# Patient Record
Sex: FEMALE | Race: WHITE | NOT HISPANIC OR LATINO
[De-identification: names, ages, dates, MRNs, and addresses within clinical notes are randomized per-mention and may not be internally consistent; named-entity substitution may affect disease eponyms.]

---

## 2017-04-10 PROBLEM — Z00.00 ENCOUNTER FOR PREVENTIVE HEALTH EXAMINATION: Status: ACTIVE | Noted: 2017-04-10

## 2017-05-10 ENCOUNTER — APPOINTMENT (OUTPATIENT)
Dept: OPHTHALMOLOGY | Facility: CLINIC | Age: 62
End: 2017-05-10

## 2019-07-17 ENCOUNTER — APPOINTMENT (OUTPATIENT)
Dept: OTOLARYNGOLOGY | Facility: CLINIC | Age: 64
End: 2019-07-17
Payer: MEDICARE

## 2019-07-17 ENCOUNTER — APPOINTMENT (OUTPATIENT)
Dept: OTOLARYNGOLOGY | Facility: CLINIC | Age: 64
End: 2019-07-17
Payer: COMMERCIAL

## 2019-07-17 VITALS
HEIGHT: 65 IN | DIASTOLIC BLOOD PRESSURE: 73 MMHG | HEART RATE: 76 BPM | WEIGHT: 125 LBS | BODY MASS INDEX: 20.83 KG/M2 | SYSTOLIC BLOOD PRESSURE: 117 MMHG

## 2019-07-17 DIAGNOSIS — H91.13 PRESBYCUSIS, BILATERAL: ICD-10-CM

## 2019-07-17 DIAGNOSIS — H61.23 IMPACTED CERUMEN, BILATERAL: ICD-10-CM

## 2019-07-17 DIAGNOSIS — H90.3 SENSORINEURAL HEARING LOSS, BILATERAL: ICD-10-CM

## 2019-07-17 PROCEDURE — 99213 OFFICE O/P EST LOW 20 MIN: CPT | Mod: 25

## 2019-07-17 PROCEDURE — 92567 TYMPANOMETRY: CPT

## 2019-07-17 PROCEDURE — 92557 COMPREHENSIVE HEARING TEST: CPT

## 2019-07-17 PROCEDURE — 69210 REMOVE IMPACTED EAR WAX UNI: CPT

## 2019-07-17 RX ORDER — AMITRIPTYLINE HYDROCHLORIDE 75 MG/1
TABLET, FILM COATED ORAL
Refills: 0 | Status: ACTIVE | COMMUNITY

## 2019-07-17 NOTE — HISTORY OF PRESENT ILLNESS
[de-identified] : SUDHAKAR URBANO is a 64 year woman with a history of neurogenic cough doing well with Amitriptyline hs. She complains of processive hearing loss. She has bilateral tinnitus.

## 2019-07-17 NOTE — REASON FOR VISIT
[Hearing Loss] : hearing loss [Subsequent Evaluation] : a subsequent evaluation for [FreeTextEntry2] : cough

## 2019-07-17 NOTE — REVIEW OF SYSTEMS
[Hearing Loss] : hearing loss [Cough] : cough [Patient Intake Form Reviewed] : Patient intake form was reviewed

## 2019-08-07 ENCOUNTER — APPOINTMENT (OUTPATIENT)
Dept: OTOLARYNGOLOGY | Facility: CLINIC | Age: 64
End: 2019-08-07
Payer: COMMERCIAL

## 2019-08-07 PROCEDURE — 92591 HEARING AID EXAMINATION & SELECTION BINAURAL: CPT | Mod: NC

## 2019-08-23 PROBLEM — H90.3 SENSORINEURAL HEARING LOSS (SNHL) OF BOTH EARS: Status: ACTIVE | Noted: 2019-08-23

## 2019-09-05 ENCOUNTER — APPOINTMENT (OUTPATIENT)
Dept: OTOLARYNGOLOGY | Facility: CLINIC | Age: 64
End: 2019-09-05
Payer: SELF-PAY

## 2019-09-05 PROCEDURE — V5261I: CUSTOM

## 2019-09-12 ENCOUNTER — APPOINTMENT (OUTPATIENT)
Dept: OTOLARYNGOLOGY | Facility: CLINIC | Age: 64
End: 2019-09-12
Payer: SELF-PAY

## 2019-09-12 PROCEDURE — 92593: CPT | Mod: NC

## 2020-02-11 ENCOUNTER — RX RENEWAL (OUTPATIENT)
Age: 65
End: 2020-02-11

## 2020-02-11 DIAGNOSIS — R05 COUGH: ICD-10-CM

## 2021-03-09 ENCOUNTER — RX RENEWAL (OUTPATIENT)
Age: 66
End: 2021-03-09

## 2021-07-30 ENCOUNTER — RX RENEWAL (OUTPATIENT)
Age: 66
End: 2021-07-30

## 2021-11-12 ENCOUNTER — APPOINTMENT (OUTPATIENT)
Dept: OTOLARYNGOLOGY | Facility: CLINIC | Age: 66
End: 2021-11-12
Payer: SELF-PAY

## 2021-11-12 PROCEDURE — 92593: CPT | Mod: NC

## 2021-11-24 ENCOUNTER — NON-APPOINTMENT (OUTPATIENT)
Age: 66
End: 2021-11-24

## 2022-03-23 ENCOUNTER — RX RENEWAL (OUTPATIENT)
Age: 67
End: 2022-03-23

## 2023-02-19 ENCOUNTER — NON-APPOINTMENT (OUTPATIENT)
Age: 68
End: 2023-02-19

## 2023-02-21 ENCOUNTER — RX RENEWAL (OUTPATIENT)
Age: 68
End: 2023-02-21

## 2023-10-25 ENCOUNTER — RX RENEWAL (OUTPATIENT)
Age: 68
End: 2023-10-25

## 2023-10-25 RX ORDER — AMITRIPTYLINE HYDROCHLORIDE 25 MG/1
25 TABLET, FILM COATED ORAL
Qty: 90 | Refills: 1 | Status: ACTIVE | COMMUNITY
Start: 2020-02-11 | End: 1900-01-01

## 2024-04-24 ENCOUNTER — NON-APPOINTMENT (OUTPATIENT)
Age: 69
End: 2024-04-24

## 2024-09-16 ENCOUNTER — APPOINTMENT (OUTPATIENT)
Dept: OTOLARYNGOLOGY | Facility: CLINIC | Age: 69
End: 2024-09-16

## 2024-10-25 ENCOUNTER — RX RENEWAL (OUTPATIENT)
Age: 69
End: 2024-10-25

## 2025-07-24 ENCOUNTER — RX RENEWAL (OUTPATIENT)
Age: 70
End: 2025-07-24

## 2025-09-02 ENCOUNTER — NON-APPOINTMENT (OUTPATIENT)
Age: 70
End: 2025-09-02

## 2025-09-02 ENCOUNTER — APPOINTMENT (OUTPATIENT)
Dept: OPHTHALMOLOGY | Facility: CLINIC | Age: 70
End: 2025-09-02
Payer: MEDICARE

## 2025-09-02 PROCEDURE — 92002 INTRM OPH EXAM NEW PATIENT: CPT
